# Patient Record
Sex: FEMALE | Race: OTHER | Employment: UNEMPLOYED | URBAN - METROPOLITAN AREA
[De-identification: names, ages, dates, MRNs, and addresses within clinical notes are randomized per-mention and may not be internally consistent; named-entity substitution may affect disease eponyms.]

---

## 2020-10-05 PROBLEM — I10 ESSENTIAL HYPERTENSION: Status: ACTIVE | Noted: 2020-10-05

## 2020-10-05 PROBLEM — L40.9 SCALP PSORIASIS: Status: ACTIVE | Noted: 2020-10-05

## 2020-10-05 PROBLEM — E11.65 TYPE 2 DIABETES MELLITUS WITH HYPERGLYCEMIA, WITHOUT LONG-TERM CURRENT USE OF INSULIN (HCC): Status: ACTIVE | Noted: 2020-10-05

## 2020-10-05 PROBLEM — E78.2 MIXED HYPERLIPIDEMIA: Status: ACTIVE | Noted: 2020-10-05

## 2020-10-05 PROBLEM — F41.0 PANIC DISORDER: Status: ACTIVE | Noted: 2020-10-05

## 2021-02-15 ENCOUNTER — HOSPITAL ENCOUNTER (OUTPATIENT)
Dept: MAMMOGRAPHY | Age: 56
Discharge: HOME | End: 2021-02-15
Attending: FAMILY MEDICINE
Payer: COMMERCIAL

## 2021-02-15 DIAGNOSIS — Z12.31 SCREENING MAMMOGRAM, ENCOUNTER FOR: ICD-10-CM

## 2021-02-15 PROCEDURE — 77067 SCR MAMMO BI INCL CAD: CPT

## 2021-08-19 ENCOUNTER — HOSPITAL ENCOUNTER (EMERGENCY)
Age: 56
Discharge: HOME | End: 2021-08-19
Attending: EMERGENCY MEDICINE
Payer: COMMERCIAL

## 2021-08-19 VITALS
TEMPERATURE: 98.5 F | SYSTOLIC BLOOD PRESSURE: 116 MMHG | OXYGEN SATURATION: 93 % | HEIGHT: 63 IN | RESPIRATION RATE: 18 BRPM | HEART RATE: 71 BPM | WEIGHT: 178 LBS | BODY MASS INDEX: 31.54 KG/M2 | DIASTOLIC BLOOD PRESSURE: 72 MMHG

## 2021-08-19 DIAGNOSIS — M62.838 MUSCLE SPASM: ICD-10-CM

## 2021-08-19 DIAGNOSIS — M54.50 LUMBAR BACK PAIN: Primary | ICD-10-CM

## 2021-08-19 PROCEDURE — 99283 EMERGENCY DEPT VISIT LOW MDM: CPT

## 2021-08-19 PROCEDURE — 74011000250 HC RX REV CODE- 250: Performed by: EMERGENCY MEDICINE

## 2021-08-19 PROCEDURE — 74011250636 HC RX REV CODE- 250/636: Performed by: EMERGENCY MEDICINE

## 2021-08-19 PROCEDURE — 75810000123 HC INJ'S ANES/STEROID AGT PERIPH NERVE

## 2021-08-19 RX ORDER — TRIAMCINOLONE ACETONIDE 40 MG/ML
40 INJECTION, SUSPENSION INTRA-ARTICULAR; INTRAMUSCULAR ONCE
Status: COMPLETED | OUTPATIENT
Start: 2021-08-19 | End: 2021-08-19

## 2021-08-19 RX ORDER — LIDOCAINE HYDROCHLORIDE 10 MG/ML
5 INJECTION INFILTRATION; PERINEURAL ONCE
Status: COMPLETED | OUTPATIENT
Start: 2021-08-19 | End: 2021-08-19

## 2021-08-19 RX ORDER — LIDOCAINE 50 MG/G
PATCH TOPICAL
Qty: 10 EACH | Refills: 0 | Status: SHIPPED | OUTPATIENT
Start: 2021-08-19

## 2021-08-19 RX ORDER — METHOCARBAMOL 500 MG/1
500 TABLET, FILM COATED ORAL
Qty: 30 TABLET | Refills: 0 | Status: SHIPPED | OUTPATIENT
Start: 2021-08-19 | End: 2021-09-22 | Stop reason: ALTCHOICE

## 2021-08-19 RX ORDER — MELOXICAM 15 MG/1
15 TABLET ORAL DAILY
Qty: 30 TABLET | Refills: 0 | Status: SHIPPED | OUTPATIENT
Start: 2021-08-19

## 2021-08-19 RX ADMIN — TRIAMCINOLONE ACETONIDE 40 MG: 40 INJECTION, SUSPENSION INTRA-ARTICULAR; INTRAMUSCULAR at 09:49

## 2021-08-19 RX ADMIN — LIDOCAINE HYDROCHLORIDE 5 ML: 10 INJECTION, SOLUTION INFILTRATION; PERINEURAL at 09:49

## 2021-08-19 NOTE — ED TRIAGE NOTES
Patient arrives via [address] [place] [address] from home with mask in place. Patient reports left lower back pain and needing to urinate.

## 2021-08-19 NOTE — ED NOTES
I have reviewed discharge instructions with the patient. The patient verbalized understanding. Patient left ED via Discharge Method: ambulatory to Home with (insert name of family/friend, self, transport family). Opportunity for questions and clarification provided. Patient given 3 scripts. To continue your aftercare when you leave the hospital, you may receive an automated call from our care team to check in on how you are doing.  This is a free service and part of our promise to provide the best care and service to meet your aftercare needs. \" If you have questions, or wish to unsubscribe from this service please call [phone].  Thank you for Choosing our [facility].

## 2021-08-19 NOTE — ED PROVIDER NOTES
Patient is an 59-year-old female presenting to the emergency department today complaining of left lumbar paraspinal muscle pain which she said started about 3 weeks ago and is progressively worsened. She denies any trauma or injury to the area. She went to her doctor on 8/11/2021 had a Pap smear blood work and urinalysis done. She says that her kidney function was slightly elevated but her doctor told her it was okay and nothing to worry about. The patient said that last night her back pain started getting worse and this morning she got up to go to the bathroom and could not walk because of the severe pain. The patient notes the pain is reproducible. She denies any loss control bowel or bladder decrease sensation in the lower extremities or pain radiating into the legs. History reviewed. No pertinent past medical history.     Past Surgical History:   Procedure Laterality Date    HX PELVIC LAPAROSCOPY      removal of ovarian cysts    HX SHOULDER ARTHROSCOPY      IR CHOLECYSTOSTOMY PERCUTANEOUS           Family History:   Problem Relation Age of Onset    Alzheimer Mother     Diabetes Mother     Hypertension Mother     Heart Disease Father     Cancer Brother     Breast Cancer Cousin        Social History     Socioeconomic History    Marital status: LIFE PARTNER     Spouse name: Not on file    Number of children: 2    Years of education: Not on file    Highest education level: Not on file   Occupational History    Occupation: unemployed   Tobacco Use    Smoking status: Never Smoker    Smokeless tobacco: Never Used   Substance and Sexual Activity    Alcohol use: Never    Drug use: Never    Sexual activity: Not Currently   Other Topics Concern    Not on file   Social History Narrative    Not on file     Social Determinants of Health     Financial Resource Strain: Low Risk     Difficulty of Paying Living Expenses: Not hard at all   Food Insecurity: No Food Insecurity    Worried About Running Out of Food in the Last Year: Never true    Ran Out of Food in the Last Year: Never true   Transportation Needs: No Transportation Needs    Lack of Transportation (Medical): No    Lack of Transportation (Non-Medical): No   Physical Activity:     Days of Exercise per Week:     Minutes of Exercise per Session:    Stress:     Feeling of Stress :    Social Connections:     Frequency of Communication with Friends and Family:     Frequency of Social Gatherings with Friends and Family:     Attends Alevism Services:     Active Member of Clubs or Organizations:     Attends Club or Organization Meetings:     Marital Status:    Intimate Partner Violence:     Fear of Current or Ex-Partner:     Emotionally Abused:     Physically Abused:     Sexually Abused: ALLERGIES: Patient has no known allergies. Review of Systems   Gastrointestinal: Negative. Genitourinary: Negative. Musculoskeletal: Positive for back pain. Neurological: Negative. Hematological: Negative. All other systems reviewed and are negative. Vitals:    08/19/21 0903   BP: 116/72   Pulse: 71   Resp: 18   Temp: 98.5 °F (36.9 °C)   SpO2: 93%   Weight: 80.7 kg (178 lb)   Height: 5' 3\" (1.6 m)            Physical Exam     GENERAL:The patient has Body mass index is 31.53 kg/m². Well-hydrated. Moderate discomfort secondary to back pain. VITAL SIGNS: Heart rate, blood pressure, respiratory rate reviewed as recorded in  nurse's notes  EYES: Pupils reactive. Extraocular motion intact. No conjunctival redness or drainage. LUNGS: No accessory muscle use  CARDIOVASCULAR: Regular rate and rhythm  BACK: Tenderness palpation left lumbar paraspinal muscles with positive red reflex and reproducible tenderness. No tenderness palpation of the spinous processes or step-off deformities in thoracic or lumbar spine. EXTREMITIES: Pt moving all 4 extremities with out limitations. Normal muscle tone.   NEUROLOGIC: Cranial nerve exam reveals face is symmetrical, tongue is midline  speech is clear. No focal deficits noted. No saddle anesthesia present. Patient has no radicular symptoms noted. SKIN: No rash or petechiae. Good skin turgor palpated. PSYCHIATRIC: Alert and oriented. Appropriate behavior and judgment. MDM  Number of Diagnoses or Management Options  Diagnosis management comments: Chronic back pain, contusion, myofascial strain, musculoskeletal injury, lumbar strain,  muscle spasm,    Disc herniation, compression fracture,    Retroperitoneal injury, renal colic, pyelonephritis, nephrolithiasis    Intraspinal tumor, chronic equinus syndrome, epidural compressive syndrome, epidural  abscess, abdominal aneurysm, ankylosing spondylitis,         Amount and/or Complexity of Data Reviewed  Tests in the medicine section of CPT®: ordered and reviewed           Other Procedure    Date/Time: 8/19/2021 10:22 AM  Performed by: Geena Galeano DO  Authorized by: Geena Galeano DO     Consent:     Consent obtained:  Verbal    Consent given by:  Patient    Risks discussed:  Infection, bleeding, nerve damage and pain    Alternatives discussed:  No treatment and alternative treatment  Pre-procedure details:     Skin preparation:  ChloraPrep  Anesthesia (see MAR for exact dosages): Anesthesia method:  Local infiltration    Local anesthetic:  Lidocaine 1% w/o epi  Comments:      Trigger point injections were done in the emergency department today with the patient's permission to the left lumbar paraspinal muscles. Risk and benefits were discussed with them. Patient's skin was cleansed and using a 27-gauge 6 locations were marked and injected with 1 cc of lidocaine and Kenalog mixture. The patient did have relief with the injections and has had improvement with her pain.

## 2021-09-29 ENCOUNTER — HOSPITAL ENCOUNTER (OUTPATIENT)
Dept: CT IMAGING | Age: 56
Discharge: HOME | End: 2021-09-29
Payer: COMMERCIAL

## 2021-09-29 DIAGNOSIS — R10.9 FLANK PAIN: ICD-10-CM

## 2021-09-29 PROCEDURE — 74176 CT ABD & PELVIS W/O CONTRAST: CPT

## 2021-09-30 NOTE — PROGRESS NOTES
Discussed results with patient. No kidney stone but does have medium sized ovarian cyst.  Will refer to GYN for further evaluation. Patient is post menopausal.  No free fluid in pelvis. Will go ahead and refer to PT for low back pain also.

## 2021-10-11 PROBLEM — N83.209 OVARIAN CYST: Status: ACTIVE | Noted: 2021-10-11

## 2021-10-11 PROBLEM — N94.10 DYSPAREUNIA, FEMALE: Status: ACTIVE | Noted: 2021-10-11

## 2022-03-18 PROBLEM — N83.209 OVARIAN CYST: Status: ACTIVE | Noted: 2021-10-11

## 2022-03-19 PROBLEM — F41.0 PANIC DISORDER: Status: ACTIVE | Noted: 2020-10-05

## 2022-03-19 PROBLEM — I10 ESSENTIAL HYPERTENSION: Status: ACTIVE | Noted: 2020-10-05

## 2022-03-19 PROBLEM — E78.2 MIXED HYPERLIPIDEMIA: Status: ACTIVE | Noted: 2020-10-05

## 2022-03-19 PROBLEM — L40.9 SCALP PSORIASIS: Status: ACTIVE | Noted: 2020-10-05

## 2022-03-19 PROBLEM — E11.65 TYPE 2 DIABETES MELLITUS WITH HYPERGLYCEMIA, WITHOUT LONG-TERM CURRENT USE OF INSULIN (HCC): Status: ACTIVE | Noted: 2020-10-05

## 2022-03-20 PROBLEM — N94.10 DYSPAREUNIA, FEMALE: Status: ACTIVE | Noted: 2021-10-11

## 2022-09-30 ENCOUNTER — TELEPHONE (OUTPATIENT)
Dept: FAMILY MEDICINE CLINIC | Facility: CLINIC | Age: 57
End: 2022-09-30

## 2022-09-30 NOTE — TELEPHONE ENCOUNTER
----- Message from [facility] sent at 9/29/2022  1:23 PM EDT -----  Subject: Message to Provider    QUESTIONS  Information for Provider? Pt states the correct fax number for her medical   records to be sent are [phone]  ---------------------------------------------------------------------------  --------------  [address] [facility]  [phone]; OK to leave message on voicemail  ---------------------------------------------------------------------------  --------------  SCRIPT ANSWERS  Relationship to Patient?  Self